# Patient Record
Sex: MALE | Race: WHITE | NOT HISPANIC OR LATINO | URBAN - METROPOLITAN AREA
[De-identification: names, ages, dates, MRNs, and addresses within clinical notes are randomized per-mention and may not be internally consistent; named-entity substitution may affect disease eponyms.]

---

## 2020-10-15 ENCOUNTER — INPATIENT (INPATIENT)
Facility: HOSPITAL | Age: 70
LOS: 1 days | Discharge: HOME | DRG: 246 | End: 2020-10-17
Attending: INTERNAL MEDICINE | Admitting: INTERNAL MEDICINE
Payer: MEDICARE

## 2020-10-15 VITALS
WEIGHT: 189.6 LBS | DIASTOLIC BLOOD PRESSURE: 63 MMHG | HEART RATE: 67 BPM | SYSTOLIC BLOOD PRESSURE: 148 MMHG | HEIGHT: 66 IN | RESPIRATION RATE: 16 BRPM | OXYGEN SATURATION: 97 %

## 2020-10-15 DIAGNOSIS — Z98.890 OTHER SPECIFIED POSTPROCEDURAL STATES: Chronic | ICD-10-CM

## 2020-10-15 DIAGNOSIS — K21.9 GASTRO-ESOPHAGEAL REFLUX DISEASE WITHOUT ESOPHAGITIS: ICD-10-CM

## 2020-10-15 DIAGNOSIS — D72.829 ELEVATED WHITE BLOOD CELL COUNT, UNSPECIFIED: ICD-10-CM

## 2020-10-15 DIAGNOSIS — I20.0 UNSTABLE ANGINA: ICD-10-CM

## 2020-10-15 LAB
A1C WITH ESTIMATED AVERAGE GLUCOSE RESULT: 6.3 % — HIGH (ref 4–5.6)
ALBUMIN SERPL ELPH-MCNC: 4.2 G/DL — SIGNIFICANT CHANGE UP (ref 3.3–5)
ALP SERPL-CCNC: 70 U/L — SIGNIFICANT CHANGE UP (ref 40–120)
ALT FLD-CCNC: 17 U/L — SIGNIFICANT CHANGE UP (ref 10–45)
ANION GAP SERPL CALC-SCNC: 11 MMOL/L — SIGNIFICANT CHANGE UP (ref 5–17)
APTT BLD: 58.2 SEC — HIGH (ref 27.5–35.5)
AST SERPL-CCNC: 16 U/L — SIGNIFICANT CHANGE UP (ref 10–40)
BASOPHILS # BLD AUTO: 0.03 K/UL — SIGNIFICANT CHANGE UP (ref 0–0.2)
BASOPHILS NFR BLD AUTO: 0.3 % — SIGNIFICANT CHANGE UP (ref 0–2)
BILIRUB SERPL-MCNC: 0.4 MG/DL — SIGNIFICANT CHANGE UP (ref 0.2–1.2)
BUN SERPL-MCNC: 16 MG/DL — SIGNIFICANT CHANGE UP (ref 7–23)
CALCIUM SERPL-MCNC: 9.2 MG/DL — SIGNIFICANT CHANGE UP (ref 8.4–10.5)
CHLORIDE SERPL-SCNC: 106 MMOL/L — SIGNIFICANT CHANGE UP (ref 96–108)
CHOLEST SERPL-MCNC: 158 MG/DL — SIGNIFICANT CHANGE UP (ref 10–199)
CO2 SERPL-SCNC: 24 MMOL/L — SIGNIFICANT CHANGE UP (ref 22–31)
CREAT SERPL-MCNC: 1.01 MG/DL — SIGNIFICANT CHANGE UP (ref 0.5–1.3)
EOSINOPHIL # BLD AUTO: 0.1 K/UL — SIGNIFICANT CHANGE UP (ref 0–0.5)
EOSINOPHIL NFR BLD AUTO: 0.8 % — SIGNIFICANT CHANGE UP (ref 0–6)
ESTIMATED AVERAGE GLUCOSE: 134 MG/DL — HIGH (ref 68–114)
GLUCOSE SERPL-MCNC: 115 MG/DL — HIGH (ref 70–99)
HCT VFR BLD CALC: 40.8 % — SIGNIFICANT CHANGE UP (ref 39–50)
HDLC SERPL-MCNC: 45 MG/DL — SIGNIFICANT CHANGE UP
HGB BLD-MCNC: 14.3 G/DL — SIGNIFICANT CHANGE UP (ref 13–17)
IMM GRANULOCYTES NFR BLD AUTO: 0.2 % — SIGNIFICANT CHANGE UP (ref 0–1.5)
INR BLD: 1.18 — HIGH (ref 0.88–1.16)
LIPID PNL WITH DIRECT LDL SERPL: 87 MG/DL — SIGNIFICANT CHANGE UP
LYMPHOCYTES # BLD AUTO: 25.5 % — SIGNIFICANT CHANGE UP (ref 13–44)
LYMPHOCYTES # BLD AUTO: 3.03 K/UL — SIGNIFICANT CHANGE UP (ref 1–3.3)
MCHC RBC-ENTMCNC: 30.9 PG — SIGNIFICANT CHANGE UP (ref 27–34)
MCHC RBC-ENTMCNC: 35 GM/DL — SIGNIFICANT CHANGE UP (ref 32–36)
MCV RBC AUTO: 88.1 FL — SIGNIFICANT CHANGE UP (ref 80–100)
MONOCYTES # BLD AUTO: 0.99 K/UL — HIGH (ref 0–0.9)
MONOCYTES NFR BLD AUTO: 8.3 % — SIGNIFICANT CHANGE UP (ref 2–14)
NEUTROPHILS # BLD AUTO: 7.7 K/UL — HIGH (ref 1.8–7.4)
NEUTROPHILS NFR BLD AUTO: 64.9 % — SIGNIFICANT CHANGE UP (ref 43–77)
NRBC # BLD: 0 /100 WBCS — SIGNIFICANT CHANGE UP (ref 0–0)
PLATELET # BLD AUTO: 183 K/UL — SIGNIFICANT CHANGE UP (ref 150–400)
POTASSIUM SERPL-MCNC: 3.9 MMOL/L — SIGNIFICANT CHANGE UP (ref 3.5–5.3)
POTASSIUM SERPL-SCNC: 3.9 MMOL/L — SIGNIFICANT CHANGE UP (ref 3.5–5.3)
PROT SERPL-MCNC: 7 G/DL — SIGNIFICANT CHANGE UP (ref 6–8.3)
PROTHROM AB SERPL-ACNC: 14.1 SEC — HIGH (ref 10.6–13.6)
RBC # BLD: 4.63 M/UL — SIGNIFICANT CHANGE UP (ref 4.2–5.8)
RBC # FLD: 13 % — SIGNIFICANT CHANGE UP (ref 10.3–14.5)
SODIUM SERPL-SCNC: 141 MMOL/L — SIGNIFICANT CHANGE UP (ref 135–145)
TOTAL CHOLESTEROL/HDL RATIO MEASUREMENT: 3.5 RATIO — SIGNIFICANT CHANGE UP (ref 3.4–9.6)
TRIGL SERPL-MCNC: 130 MG/DL — SIGNIFICANT CHANGE UP (ref 10–149)
TSH SERPL-MCNC: 3.52 UIU/ML — SIGNIFICANT CHANGE UP (ref 0.35–4.94)
WBC # BLD: 11.87 K/UL — HIGH (ref 3.8–10.5)
WBC # FLD AUTO: 11.87 K/UL — HIGH (ref 3.8–10.5)

## 2020-10-15 PROCEDURE — 93010 ELECTROCARDIOGRAM REPORT: CPT

## 2020-10-15 RX ORDER — CHLORHEXIDINE GLUCONATE 213 G/1000ML
1 SOLUTION TOPICAL ONCE
Refills: 0 | Status: DISCONTINUED | OUTPATIENT
Start: 2020-10-15 | End: 2020-10-17

## 2020-10-15 RX ORDER — METOPROLOL TARTRATE 50 MG
25 TABLET ORAL DAILY
Refills: 0 | Status: DISCONTINUED | OUTPATIENT
Start: 2020-10-15 | End: 2020-10-17

## 2020-10-15 RX ORDER — CHOLECALCIFEROL (VITAMIN D3) 125 MCG
1 CAPSULE ORAL
Qty: 0 | Refills: 0 | DISCHARGE

## 2020-10-15 RX ORDER — TICAGRELOR 90 MG/1
180 TABLET ORAL ONCE
Refills: 0 | Status: COMPLETED | OUTPATIENT
Start: 2020-10-16 | End: 2020-10-16

## 2020-10-15 RX ORDER — SODIUM CHLORIDE 9 MG/ML
1000 INJECTION INTRAMUSCULAR; INTRAVENOUS; SUBCUTANEOUS
Refills: 0 | Status: DISCONTINUED | OUTPATIENT
Start: 2020-10-16 | End: 2020-10-16

## 2020-10-15 RX ORDER — MULTIVIT-MIN/FERROUS GLUCONATE 9 MG/15 ML
1 LIQUID (ML) ORAL
Qty: 0 | Refills: 0 | DISCHARGE

## 2020-10-15 RX ORDER — ASPIRIN/CALCIUM CARB/MAGNESIUM 324 MG
81 TABLET ORAL DAILY
Refills: 0 | Status: DISCONTINUED | OUTPATIENT
Start: 2020-10-16 | End: 2020-10-17

## 2020-10-15 RX ORDER — ATORVASTATIN CALCIUM 80 MG/1
20 TABLET, FILM COATED ORAL AT BEDTIME
Refills: 0 | Status: DISCONTINUED | OUTPATIENT
Start: 2020-10-15 | End: 2020-10-16

## 2020-10-15 RX ORDER — TICAGRELOR 90 MG/1
90 TABLET ORAL EVERY 12 HOURS
Refills: 0 | Status: DISCONTINUED | OUTPATIENT
Start: 2020-10-17 | End: 2020-10-17

## 2020-10-15 RX ORDER — CHOLECALCIFEROL (VITAMIN D3) 125 MCG
2000 CAPSULE ORAL DAILY
Refills: 0 | Status: DISCONTINUED | OUTPATIENT
Start: 2020-10-15 | End: 2020-10-17

## 2020-10-15 NOTE — H&P ADULT - NSHPSOCIALHISTORY_GEN_ALL_CORE
Patient reports consuming about one drink per month, and states he had a drink last night (10/14/2020). Patient admits to "one or two puffs of a joint" per year but denies any active illicit drug use. Patient denies any tobacco use.

## 2020-10-15 NOTE — H&P ADULT - PROBLEM SELECTOR PLAN 3
Does not report any home medications   - ordered for Protonix 40 mg daily       VTE PPX: holding due to cardiac cath   Dispo: pending staged PCI

## 2020-10-15 NOTE — H&P ADULT - ASSESSMENT
71 y/o male, strong FHx CAD (father MI at 51 y/o) and PMHx GERD but otherwise healthy who presented to [facility] ED on 10/15/2020 c/o intermittent chest pain, worse w/ exertion, associated with TIPTON and nausea, and w/ radiation to the left shoulder for the last few days. Patient states symptoms occur w/ ambulation of 1 flight of stairs and walking up his driveway, and also admits to intermittent symptoms at rest. CCTA at [facility] on 10/15/2020 showed Ca score 581, moderate-severe mid LAD disease, moderate proximal LAD disease, cannot exclude significant disease of mid/distal RCA, and remaining arteries non-obstructive. Patient was loaded w/ Aspirin 325 mg PO once subsequently underwent diagnostic cardiac catheterization which revealed RCA 95% stenosis and proximal/mid LAD 80% stenosis. Radial access was used, radial band was applied, and patient was transferred w/ radial band in place. Patient was subsequently transferred to [facility] with a plan for staged PCI w/ Dr. Maryse España on 10/16/2020. Upon arrival to [facility], patient was chest pain free and denying any active ymptoms. VS were stable upon arrival and afebrile, EKG revealed NSR at 64 bpm w/ T wave inversion in V1, and labs were significant for WBC 11.87 but otherwise within normal limits. Radial band was removed w/o complications. Patient was consented for procedure, added to the schedule, and made NPO after midnight for staged PCI in AM.

## 2020-10-15 NOTE — H&P ADULT - NSICDXPASTSURGICALHX_GEN_ALL_CORE_FT
PAST SURGICAL HISTORY:  H/O shoulder surgery Left shoulder    S/P arthroscopy of right shoulder     S/P excision of varicocele

## 2020-10-15 NOTE — H&P ADULT - HISTORY OF PRESENT ILLNESS
69 y/o male, strong FHx CAD (father MI at 51 y/o) and PMHx GERD but otherwise healthy who presented to [facility] ED on 10/15/2020 c/o intermittent chest pain, worse w/ exertion, associated with TIPTON and nausea, and w/ radiation to the left shoulder for the last few days. Patient reports being able to ambulate 1 flight of stairs or up his driveway before he experiences these symptoms. Patient reports he also sometimes experiences these symptoms at rest as well. Patient also reports a "pins and needles" feeling in his right hand at times, but does not know whether this is associated w/ his other symptoms. Patient denied any dizziness, syncope, palpitations, abdominal pain, vomiting, back pain, LE edema, and melena. Patient also denied any recent fever, chills, cough, and known sick contacts. At [facility], VS were stable, labs significant for COVID PCR negative, troponins negative x 2, CXR w/o acute pathology, and EKG showing NSR at 77 bpm w/o acute ischemic changes.  CCTA was performed on 10/15/2020 showing Ca score 581, moderate-severe mid LAD disease, moderate proximal LAD disease, cannot exclude significant disease of mid/distal RCA, and remaining arteries non-obstructive. Patient was loaded w/ Aspirin 325 mg PO once subsequently underwent diagnostic cardiac catheterization which revealed RCA 95% stenosis and proximal/mid LAD 80% stenosis. Radial access was used, radial band was applied, and patient was transferred w/ radial band in place. Patient was subsequently transferred to [facility] with a plan for staged PCI w/ Dr. Maryse España on 10/16/2020.

## 2020-10-15 NOTE — H&P ADULT - NSHPLABSRESULTS_GEN_ALL_CORE
CBC Full  -  ( 15 Oct 2020 22:21 )  WBC Count : 11.87 K/uL  RBC Count : 4.63 M/uL  Hemoglobin : 14.3 g/dL  Hematocrit : 40.8 %  Platelet Count - Automated : 183 K/uL  Mean Cell Volume : 88.1 fl  Mean Cell Hemoglobin : 30.9 pg  Mean Cell Hemoglobin Concentration : 35.0 gm/dL  Auto Neutrophil # : 7.70 K/uL  Auto Lymphocyte # : 3.03 K/uL  Auto Monocyte # : 0.99 K/uL  Auto Eosinophil # : 0.10 K/uL  Auto Basophil # : 0.03 K/uL  Auto Neutrophil % : 64.9 %  Auto Lymphocyte % : 25.5 %  Auto Monocyte % : 8.3 %  Auto Eosinophil % : 0.8 %  Auto Basophil % : 0.3 %    10-15    141  |  106  |  16  ----------------------------<  115<H>  3.9   |  24  |  1.01    Ca    9.2      15 Oct 2020 22:22    TPro  7.0  /  Alb  4.2  /  TBili  0.4  /  DBili  x   /  AST  16  /  ALT  17  /  AlkPhos  70  10-15      PT/INR - ( 15 Oct 2020 22:21 )   PT: 14.1 sec;   INR: 1.18          PTT - ( 15 Oct 2020 22:21 )  PTT:58.2 sec

## 2020-10-15 NOTE — H&P ADULT - NSICDXFAMILYHX_GEN_ALL_CORE_FT
FAMILY HISTORY:  FHx: coronary artery disease, Father  FHx: myocardial infarction, Father,  at age 50

## 2020-10-15 NOTE — H&P ADULT - PROBLEM SELECTOR PLAN 2
WBC 11.87 upon arrival to [facility]  - patient afebrile, denies any fever, chills, cough, or known recent sick contacts   - COVID PCR negative, results in HIE   - continue to monitor

## 2020-10-15 NOTE — PATIENT PROFILE ADULT - NSPRESCRALCFREQ_GEN_A_NUR
Detail Level: Detailed Quality 130: Documentation Of Current Medications In The Medical Record: Current Medications Documented Never

## 2020-10-16 ENCOUNTER — TRANSCRIPTION ENCOUNTER (OUTPATIENT)
Age: 70
End: 2020-10-16

## 2020-10-16 LAB
ANION GAP SERPL CALC-SCNC: 11 MMOL/L — SIGNIFICANT CHANGE UP (ref 5–17)
BUN SERPL-MCNC: 15 MG/DL — SIGNIFICANT CHANGE UP (ref 7–23)
CALCIUM SERPL-MCNC: 9.6 MG/DL — SIGNIFICANT CHANGE UP (ref 8.4–10.5)
CHLORIDE SERPL-SCNC: 103 MMOL/L — SIGNIFICANT CHANGE UP (ref 96–108)
CO2 SERPL-SCNC: 25 MMOL/L — SIGNIFICANT CHANGE UP (ref 22–31)
CREAT SERPL-MCNC: 1.01 MG/DL — SIGNIFICANT CHANGE UP (ref 0.5–1.3)
GLUCOSE SERPL-MCNC: 112 MG/DL — HIGH (ref 70–99)
HCT VFR BLD CALC: 43.8 % — SIGNIFICANT CHANGE UP (ref 39–50)
HCV AB S/CO SERPL IA: 0.09 S/CO — SIGNIFICANT CHANGE UP
HCV AB SERPL-IMP: SIGNIFICANT CHANGE UP
HGB BLD-MCNC: 14.8 G/DL — SIGNIFICANT CHANGE UP (ref 13–17)
MAGNESIUM SERPL-MCNC: 2 MG/DL — SIGNIFICANT CHANGE UP (ref 1.6–2.6)
MCHC RBC-ENTMCNC: 30.6 PG — SIGNIFICANT CHANGE UP (ref 27–34)
MCHC RBC-ENTMCNC: 33.8 GM/DL — SIGNIFICANT CHANGE UP (ref 32–36)
MCV RBC AUTO: 90.5 FL — SIGNIFICANT CHANGE UP (ref 80–100)
NRBC # BLD: 0 /100 WBCS — SIGNIFICANT CHANGE UP (ref 0–0)
PLATELET # BLD AUTO: 206 K/UL — SIGNIFICANT CHANGE UP (ref 150–400)
POTASSIUM SERPL-MCNC: 4.1 MMOL/L — SIGNIFICANT CHANGE UP (ref 3.5–5.3)
POTASSIUM SERPL-SCNC: 4.1 MMOL/L — SIGNIFICANT CHANGE UP (ref 3.5–5.3)
RBC # BLD: 4.84 M/UL — SIGNIFICANT CHANGE UP (ref 4.2–5.8)
RBC # FLD: 12.9 % — SIGNIFICANT CHANGE UP (ref 10.3–14.5)
SODIUM SERPL-SCNC: 139 MMOL/L — SIGNIFICANT CHANGE UP (ref 135–145)
WBC # BLD: 9.01 K/UL — SIGNIFICANT CHANGE UP (ref 3.8–10.5)
WBC # FLD AUTO: 9.01 K/UL — SIGNIFICANT CHANGE UP (ref 3.8–10.5)

## 2020-10-16 PROCEDURE — 93454 CORONARY ARTERY ANGIO S&I: CPT | Mod: 26,59

## 2020-10-16 PROCEDURE — 92979 ENDOLUMINL IVUS OCT C EA: CPT | Mod: 26,LD

## 2020-10-16 PROCEDURE — 92978 ENDOLUMINL IVUS OCT C 1ST: CPT | Mod: 26,LD

## 2020-10-16 PROCEDURE — 92933 PRQ TRLML C ATHRC ST ANGIOP1: CPT | Mod: LD

## 2020-10-16 PROCEDURE — 92928 PRQ TCAT PLMT NTRAC ST 1 LES: CPT | Mod: RC

## 2020-10-16 PROCEDURE — 99222 1ST HOSP IP/OBS MODERATE 55: CPT

## 2020-10-16 RX ORDER — ATORVASTATIN CALCIUM 80 MG/1
40 TABLET, FILM COATED ORAL AT BEDTIME
Refills: 0 | Status: DISCONTINUED | OUTPATIENT
Start: 2020-10-16 | End: 2020-10-17

## 2020-10-16 RX ORDER — ONDANSETRON 8 MG/1
4 TABLET, FILM COATED ORAL ONCE
Refills: 0 | Status: COMPLETED | OUTPATIENT
Start: 2020-10-16 | End: 2020-10-16

## 2020-10-16 RX ORDER — TICAGRELOR 90 MG/1
1 TABLET ORAL
Qty: 60 | Refills: 11
Start: 2020-10-16 | End: 2021-10-10

## 2020-10-16 RX ORDER — PANTOPRAZOLE SODIUM 20 MG/1
40 TABLET, DELAYED RELEASE ORAL
Refills: 0 | Status: DISCONTINUED | OUTPATIENT
Start: 2020-10-16 | End: 2020-10-17

## 2020-10-16 RX ORDER — SODIUM CHLORIDE 9 MG/ML
500 INJECTION INTRAMUSCULAR; INTRAVENOUS; SUBCUTANEOUS
Refills: 0 | Status: DISCONTINUED | OUTPATIENT
Start: 2020-10-16 | End: 2020-10-17

## 2020-10-16 RX ORDER — ACETAMINOPHEN 500 MG
650 TABLET ORAL ONCE
Refills: 0 | Status: DISCONTINUED | OUTPATIENT
Start: 2020-10-16 | End: 2020-10-16

## 2020-10-16 RX ADMIN — Medication 2000 UNIT(S): at 12:02

## 2020-10-16 RX ADMIN — Medication 25 MILLIGRAM(S): at 05:43

## 2020-10-16 RX ADMIN — SODIUM CHLORIDE 75 MILLILITER(S): 9 INJECTION INTRAMUSCULAR; INTRAVENOUS; SUBCUTANEOUS at 19:44

## 2020-10-16 RX ADMIN — TICAGRELOR 180 MILLIGRAM(S): 90 TABLET ORAL at 05:43

## 2020-10-16 RX ADMIN — Medication 81 MILLIGRAM(S): at 05:44

## 2020-10-16 RX ADMIN — ONDANSETRON 4 MILLIGRAM(S): 8 TABLET, FILM COATED ORAL at 23:08

## 2020-10-16 RX ADMIN — ATORVASTATIN CALCIUM 40 MILLIGRAM(S): 80 TABLET, FILM COATED ORAL at 21:18

## 2020-10-16 RX ADMIN — SODIUM CHLORIDE 75 MILLILITER(S): 9 INJECTION INTRAMUSCULAR; INTRAVENOUS; SUBCUTANEOUS at 05:44

## 2020-10-16 NOTE — PROGRESS NOTE ADULT - PROBLEM SELECTOR PLAN 3
Does not report any home medications   - ordered for Protonix 40 mg daily       VTE PPX: holding due to cardiac cath   Dispo: pending PCI

## 2020-10-16 NOTE — DISCHARGE NOTE PROVIDER - NSDCCPCAREPLAN_GEN_ALL_CORE_FT
PRINCIPAL DISCHARGE DIAGNOSIS  Diagnosis: CAD (coronary artery disease)  Assessment and Plan of Treatment: You underwent a cardiac angiogram and received 2 stents to your proximal/mid Right Coronary Artery (RCA), as well as 2 stents to your Left Anterior Descending Artery (LAD). PLEASE CONTINUE ASPIRIN 81MG DAILY AND BRILINTA 90MG TWICE DAILY. DO NOT STOP THESE MEDICATIONS FOR ANY REASON AS THEY ARE KEEPING YOUR STENT OPEN AND PREVENTING A HEART ATTACK. Avoid strenuous activity or heavy lifting for the next five days. Do not take a bath or swim for the next five days; you may shower. For any bleeding or hematoma formation (hardened blood collection under the skin) at the access site of right wrist please hold pressure and go to the emergency room. Please follow up with Dr. Joseph in 1-2 weeks. For recurrent chest pain, please call your doctor or go to the emergency room.   Prescriptions for Aspirin and Brilinta have been sent to your pharmacy. Brilinta has a $30/month co-pay.      SECONDARY DISCHARGE DIAGNOSES  Diagnosis: HTN (hypertension)  Assessment and Plan of Treatment: Please continue Toprol-XL 25mg by mouth daily to keep your blood pressure controlled. For blood pressure that is too high or too low please see your doctor or go to the emergency room as necessary. This prescription has been sent to your pharmacy.    Diagnosis: HLD (hyperlipidemia)  Assessment and Plan of Treatment: Please continue Atorvastatin 40mg by mouth daily at bedtime to keep your cholesterol low. High cholesterol contributes to heart disease. This prescription has been sent to your pharmacy.     PRINCIPAL DISCHARGE DIAGNOSIS  Diagnosis: CAD (coronary artery disease)  Assessment and Plan of Treatment: You underwent a cardiac angiogram and received 2 stents to your proximal/mid Right Coronary Artery (RCA), as well as 2 stents to your Left Anterior Descending Artery (LAD). PLEASE CONTINUE ASPIRIN 81MG DAILY AND BRILINTA 90MG TWICE DAILY. DO NOT STOP THESE MEDICATIONS FOR ANY REASON AS THEY ARE KEEPING YOUR STENT OPEN AND PREVENTING A HEART ATTACK. Avoid strenuous activity or heavy lifting for the next five days. Do not take a bath or swim for the next five days; you may shower. For any bleeding or hematoma formation (hardened blood collection under the skin) at the access site of right wrist please hold pressure and go to the emergency room. Please follow up with Dr. España in 1-2 weeks. For recurrent chest pain, please call your doctor or go to the emergency room.   Prescriptions for Aspirin and Brilinta have been sent to your pharmacy. Brilinta has a $30/month co-pay.      SECONDARY DISCHARGE DIAGNOSES  Diagnosis: HTN (hypertension)  Assessment and Plan of Treatment: Please continue Toprol-XL 25mg by mouth daily to keep your blood pressure controlled. For blood pressure that is too high or too low please see your doctor or go to the emergency room as necessary. This prescription has been sent to your pharmacy.    Diagnosis: HLD (hyperlipidemia)  Assessment and Plan of Treatment: Please continue Atorvastatin 40mg by mouth daily at bedtime to keep your cholesterol low. High cholesterol contributes to heart disease. This prescription has been sent to your pharmacy.

## 2020-10-16 NOTE — PROGRESS NOTE ADULT - ASSESSMENT
71 y/o male, strong FHx CAD (father MI at 51 y/o) and PMHx GERD but otherwise healthy who presented to [facility] ED on 10/15/2020 c/o intermittent chest pain, worse w/ exertion, associated with TIPTON and nausea, and w/ radiation to the left shoulder for the last few days. Patient states symptoms occur w/ ambulation of 1 flight of stairs and walking up his driveway, and also admits to intermittent symptoms at rest. CCTA at [facility] on 10/15/2020 showed Ca score 581, moderate-severe mid LAD disease, moderate proximal LAD disease, cannot exclude significant disease of mid/distal RCA, and remaining arteries non-obstructive. Patient was loaded w/ Aspirin 325 mg PO once subsequently underwent diagnostic cardiac catheterization which revealed RCA 95% stenosis and proximal/mid LAD 80% stenosis. Radial access was used, radial band was applied, and patient was transferred w/ radial band in place. Patient was subsequently transferred to [facility] with a plan for staged PCI w/ Dr. Maryse España on 10/16/2020. Upon arrival to [facility], patient was chest pain free and denying any active ymptoms. VS were stable upon arrival and afebrile, EKG revealed NSR at 64 bpm w/ T wave inversion in V1, and labs were significant for WBC 11.87 but otherwise within normal limits. Radial band was removed w/o complications. Patient was consented for procedure, added to the schedule, and made NPO after midnight for staged PCI in AM.

## 2020-10-16 NOTE — PROGRESS NOTE ADULT - PROBLEM SELECTOR PLAN 2
- WBC 11.87 upon arrival to [facility]  - patient afebrile, denies any fever, chills, cough, or known recent sick contacts   - COVID PCR negative, results in HIE   - WBC 9.01 on 10/16/2020.   - continue to monitor

## 2020-10-16 NOTE — DISCHARGE NOTE PROVIDER - PROVIDER TOKENS
PROVIDER:[TOKEN:[[id number]]] FREE:[LAST:[[place]],FIRST:[Maryse RODGERS)],PHONE:[phone]],FAX:[(   )    -],ADDRESS:[[address] [place] [address]]]

## 2020-10-16 NOTE — PROGRESS NOTE ADULT - SUBJECTIVE AND OBJECTIVE BOX
Two vessel Coronary Artery Disease  Syntax score 13  Frailty score 4    Coronary Angiogram  LMCA: Normal  LAD: 80% stenosis in prox, 80% stenosis in mid segments, heavily calcified  D1: Mild luminal irregularities  LCx: Mild luminal irregularities  OM1: Mild luminal irregularities  RCA: Large, dominant vessel with 80% stenosis in mid segment, 95% stenosis in distal segment    Left Heart Catheterization  LV Gram: Not performed    Intervention  - Successful OCT guided PCI of 95% stenosis in distal RCA with Promus DESx1. 0% residual stenosis and excellent angiographic result with ARNOLD 3 flow post intervention.  - Successful OCT guided PCI of 80% stenosis in mid RCA with Promus DESx1. 0% residual stenosis and excellent angiographic result with ARNOLD 3 flow post intervention.  - Successful OCT guided Rotational Atherectomy (RotaPro 1.5mm wilner) followed by PCI of 80% stenosis in prox and mid LAD with Promus DESx2. 0% residual stenosis and excellent angiographic result with ARNOLD 3 flow post intervention.      Radial band placed on Right Radial access site with adequate hemostasis prior to leaving the cath lab  No procedural complications        Recommendations  Dual anti-platelet therapy with Aspirin 81mg daily and Brilinta 90mg BID for at least 1 year after which Aspirin 81mg daily should be continued indefinitely  Optimal medical therapy as tolerated, including appropriate intensity statin, beta blocker and ACE/ARB if indicated  Risk factor modification and risk reduction strategies with lifestyle changes and preventative cardiology  Follow up with outpatient cardiologist (Dr Joseph) post discharge
Interventional Cardiology PA Adult Progress Note    Subjective Assessment: pt seen and evaluated at bedside. Reports he felt well overnight, other than some mild, quickly resolving chest pressure when he gets up and walks around. No complaints at this time. Pt denies SOB, diaphoresis, dizziness/syncope, abdominal pain, N/V. He reports no pain at the right radial access site.   	  MEDICATIONS:  metoprolol succinate ER 25 milliGRAM(s) Oral daily  pantoprazole    Tablet 40 milliGRAM(s) Oral before breakfast  atorvastatin 40 milliGRAM(s) Oral at bedtime  aspirin enteric coated 81 milliGRAM(s) Oral daily  chlorhexidine 4% Liquid 1 Application(s) Topical once  cholecalciferol 2000 Unit(s) Oral daily  sodium chloride 0.9%. 1000 milliLiter(s) IV Continuous <Continuous>      PHYSICAL EXAM:  TELEMETRY: no events on telemetry overnight  T(C): 37.1 (10-16-20 @ 10:19), Max: 37.1 (10-16-20 @ 10:19)  HR: 74 (10-16-20 @ 08:20) (67 - 74)  BP: 155/75 (10-16-20 @ 08:20) (131/73 - 155/75)  RR: 18 (10-16-20 @ 08:20) (16 - 18)  SpO2: 98% (10-16-20 @ 08:20) (97% - 98%)  Wt(kg): --  I&O's Summary    Height (cm): 167.6 (10-15 @ 23:11)  Weight (kg): 86 (10-15 @ 23:11)  BMI (kg/m2): 30.6 (10-15 @ 23:11)  BSA (m2): 1.95 (10-15 @ 23:11)  Cerna:  Central/PICC/Mid Line:                                         Appearance: Normal	  HEENT:   Normal oral mucosa, PERRL, EOMI	  Neck: Supple, - JVD; no Carotid Bruit   Cardiovascular: Normal S1 S2, No JVD, No murmurs,   Respiratory: Lungs clear to auscultation; No signs of respiratory distress. 	  Gastrointestinal:  Soft, Non-tender, + BS	  Skin: No rashes, No ecchymoses, No cyanosis  Extremities: Normal range of motion, No clubbing, cyanosis or edema  Vascular: Peripheral pulses palpable 2+ bilaterally; No femoral bruit's bilaterally.  Neurologic: Non-focal  Psychiatry: A & O x 3, Mood & affect appropriate  ACCESS SITE: right radial access site from previous cardiac catheterization stable without signs of bleeding or hematoma.       LABS:	 	  CARDIAC MARKERS:             14.8   9.01  )-----------( 206      ( 16 Oct 2020 07:25 )             43.8     139  |  103  |  15  ----------------------------<  112<H>  4.1   |  25  |  1.01    Ca    9.6      16 Oct 2020 07:25  Mg     2.0     10-16    TPro  7.0  /  Alb  4.2  /  TBili  0.4  /  DBili  x   /  AST  16  /  ALT  17  /  AlkPhos  70  10-15      TSH: Thyroid Stimulating Hormone, Serum: 3.525 uIU/mL (10-15 @ 22:21)    PT/INR - ( 15 Oct 2020 22:21 )   PT: 14.1 sec;   INR: 1.18          PTT - ( 15 Oct 2020 22:21 )  PTT:58.2 sec

## 2020-10-16 NOTE — PROGRESS NOTE ADULT - ATTENDING COMMENTS
Initial attending contact date 10/16/20     . See PA note written above for details. I reviewed the PA documentation. I have personally seen and examined this patient. I reviewed vitals, labs, medications, cardiac studies, and additional imaging. I agree with the above PA's findings and plans as written above with the following additions/statements.    -pt with mild CP this am  -With known CAD by diagnostic Cath @ [facility], for planned PCI today  -Cont ASA/brilinita/statin/toprol. Brian prn pain  -ECHO  -Plan for DC 10/17

## 2020-10-16 NOTE — DISCHARGE NOTE PROVIDER - NSDCMRMEDTOKEN_GEN_ALL_CORE_FT
Aspirin Enteric Coated 81 mg oral delayed release tablet: 1 tab(s) orally once a day  Benadryl 25 mg oral tablet: 1 tab(s) orally once a day (at bedtime), As Needed  Centrum Ultra Men&#x27;s oral tablet: 1 tab(s) orally once a day  ticagrelor 90 mg oral tablet: 1 tab(s) orally every 12 hours  Vitamin D3 2000 intl units (50 mcg) oral tablet: 1 tab(s) orally once a day   Aspirin Enteric Coated 81 mg oral delayed release tablet: 1 tab(s) orally once a day  atorvastatin 40 mg oral tablet: 1 tab(s) orally once a day (at bedtime)  Centrum Ultra Men&#x27;s oral tablet: 1 tab(s) orally once a day  metoprolol succinate 25 mg oral tablet, extended release: 1 tab(s) orally once a day   ticagrelor 90 mg oral tablet: 1 tab(s) orally every 12 hours  Vitamin D3 2000 intl units (50 mcg) oral tablet: 1 tab(s) orally once a day

## 2020-10-16 NOTE — PROGRESS NOTE ADULT - PROBLEM SELECTOR PLAN 1
- Presented to [facility] w/ chest pain and TIPTON for few days   - EKG at [facility] showed NSR at 77 bpm w/o ischemic changes   - Trop (-) x2 at [facility] negative x 2  - EKG at [facility] NSR at 64 bpm w/ T wave inversion in V1  - CCTA (10/15/2020) at [facility] showed Ca score 581, moderate-severe mid LAD disease, moderate proximal LAD disease  - DIAGNOSTIC CATH @ [facility] - RCA 95% stenosis and prox/mid LAD 80% stenosis.   - Loaded w/ ASA 325mg PO prior to [facility] cath; Loaded w/ Brilinta 180mg PO x1 at [facility].   - PLAN: planned PCI w/ Dr. España 10/16/2020.   - CONTINUE: ASA 81mg PO QD, Brilinta 90mg PO BID (starting 10/17/20), Atorvastatin 40mg PO QD.   - F/U results of cardiac catheterization.

## 2020-10-16 NOTE — DISCHARGE NOTE PROVIDER - HOSPITAL COURSE
... 69 y/o male, strong FHx CAD (father MI at 51 y/o) and PMHx GERD but otherwise healthy who presented to [facility] ED on 10/15/2020 c/o intermittent chest pain, worse w/ exertion, associated with TIPTON and nausea, and w/ radiation to the left shoulder for the last few days. Patient reports being able to ambulate 1 flight of stairs or up his driveway before he experiences these symptoms. Patient reports he also sometimes experiences these symptoms at rest as well. Patient also reports a "pins and needles" feeling in his right hand at times, but does not know whether this is associated w/ his other symptoms. Patient denied any dizziness, syncope, palpitations, abdominal pain, vomiting, back pain, LE edema, and melena. Patient also denied any recent fever, chills, cough, and known sick contacts. At [facility], VS were stable, labs significant for COVID PCR negative, troponins negative x 2, CXR w/o acute pathology, and EKG showing NSR at 77 bpm w/o acute ischemic changes.  CCTA was performed on 10/15/2020 showing Ca score 581, moderate-severe mid LAD disease, moderate proximal LAD disease, cannot exclude significant disease of mid/distal RCA, and remaining arteries non-obstructive. Patient was loaded w/ Aspirin 325 mg PO once subsequently underwent diagnostic cardiac catheterization which revealed RCA 95% stenosis and proximal/mid LAD 80% stenosis. Radial access was used, radial band was applied, and patient was transferred w/ radial band in place. Patient was subsequently transferred to [facility] with a plan for staged PCI w/ Dr. España on 10/16/2020.    Pt is s/p cardiac catheterization (10/16/2020) w/ FORREST x2 to m/dRCA and FORREST x2 to LAD. Pt w/ R radial access, with successful hemostasis achieved via TR band. Intra-procedure, pt w/ episode of HTN SBP 160s, and was given Hydralazine 10mg IV x1 and Lopressor 5mg IV x1. Late in procedure, patient had a vasovagal episode and was given Neosynephrine 4mg IV x1, Atropine 0.6mg IV x1, and NS 500cc bolus x1. Pt was also given Zofran 8mg IV x1 for post-procedure nausea.     There were no overnight events. Pt denies complaints at this time. Physical exam benign, no events on telemetry, vital signs stable. Labs, vitals, telemetry, and hospital course reviewed with telemetry attending and patient deemed stable for discharge home.     DAPT: ASA 81mg PO QD, Brilinta 90mg PO BID ($30/month co-pay; management expense for the patient).   Statin Therapy: Atorvastatin 40mg PO QD  F/U Cardiologist: Dr. España

## 2020-10-16 NOTE — DISCHARGE NOTE PROVIDER - NSDCFUADDAPPT_GEN_ALL_CORE_FT
Please follow up with Dr. Joseph at [facility] within 1-2 weeks of discharge from the hospital. Please follow up with Dr. España within 1-2 weeks of discharge from the hospital.

## 2020-10-16 NOTE — DISCHARGE NOTE PROVIDER - CARE PROVIDER_API CALL
Maryse España (MD)  Cardiovascular Disease; Internal Medicine  [address] [place] [address]  Phone: [phone]  Fax: [phone]  Follow Up Time:    Maryse España)  [address] [place] [address]  Phone: [phone]  Fax: (   )    -  Follow Up Time:

## 2020-10-17 ENCOUNTER — TRANSCRIPTION ENCOUNTER (OUTPATIENT)
Age: 70
End: 2020-10-17

## 2020-10-17 VITALS — TEMPERATURE: 98 F

## 2020-10-17 LAB
ALBUMIN SERPL ELPH-MCNC: 3.9 G/DL — SIGNIFICANT CHANGE UP (ref 3.3–5)
ALP SERPL-CCNC: 65 U/L — SIGNIFICANT CHANGE UP (ref 40–120)
ALT FLD-CCNC: 14 U/L — SIGNIFICANT CHANGE UP (ref 10–45)
ANION GAP SERPL CALC-SCNC: 13 MMOL/L — SIGNIFICANT CHANGE UP (ref 5–17)
AST SERPL-CCNC: 26 U/L — SIGNIFICANT CHANGE UP (ref 10–40)
BASOPHILS # BLD AUTO: 0.02 K/UL — SIGNIFICANT CHANGE UP (ref 0–0.2)
BASOPHILS NFR BLD AUTO: 0.2 % — SIGNIFICANT CHANGE UP (ref 0–2)
BILIRUB SERPL-MCNC: 0.6 MG/DL — SIGNIFICANT CHANGE UP (ref 0.2–1.2)
BUN SERPL-MCNC: 13 MG/DL — SIGNIFICANT CHANGE UP (ref 7–23)
CALCIUM SERPL-MCNC: 9.2 MG/DL — SIGNIFICANT CHANGE UP (ref 8.4–10.5)
CHLORIDE SERPL-SCNC: 101 MMOL/L — SIGNIFICANT CHANGE UP (ref 96–108)
CO2 SERPL-SCNC: 23 MMOL/L — SIGNIFICANT CHANGE UP (ref 22–31)
CREAT SERPL-MCNC: 1.2 MG/DL — SIGNIFICANT CHANGE UP (ref 0.5–1.3)
EOSINOPHIL # BLD AUTO: 0.02 K/UL — SIGNIFICANT CHANGE UP (ref 0–0.5)
EOSINOPHIL NFR BLD AUTO: 0.2 % — SIGNIFICANT CHANGE UP (ref 0–6)
GLUCOSE SERPL-MCNC: 124 MG/DL — HIGH (ref 70–99)
HCT VFR BLD CALC: 39.2 % — SIGNIFICANT CHANGE UP (ref 39–50)
HGB BLD-MCNC: 13.5 G/DL — SIGNIFICANT CHANGE UP (ref 13–17)
IMM GRANULOCYTES NFR BLD AUTO: 0.5 % — SIGNIFICANT CHANGE UP (ref 0–1.5)
LYMPHOCYTES # BLD AUTO: 17.5 % — SIGNIFICANT CHANGE UP (ref 13–44)
LYMPHOCYTES # BLD AUTO: 2.1 K/UL — SIGNIFICANT CHANGE UP (ref 1–3.3)
MAGNESIUM SERPL-MCNC: 2.1 MG/DL — SIGNIFICANT CHANGE UP (ref 1.6–2.6)
MCHC RBC-ENTMCNC: 30.9 PG — SIGNIFICANT CHANGE UP (ref 27–34)
MCHC RBC-ENTMCNC: 34.4 GM/DL — SIGNIFICANT CHANGE UP (ref 32–36)
MCV RBC AUTO: 89.7 FL — SIGNIFICANT CHANGE UP (ref 80–100)
MONOCYTES # BLD AUTO: 1.05 K/UL — HIGH (ref 0–0.9)
MONOCYTES NFR BLD AUTO: 8.7 % — SIGNIFICANT CHANGE UP (ref 2–14)
NEUTROPHILS # BLD AUTO: 8.77 K/UL — HIGH (ref 1.8–7.4)
NEUTROPHILS NFR BLD AUTO: 72.9 % — SIGNIFICANT CHANGE UP (ref 43–77)
NRBC # BLD: 0 /100 WBCS — SIGNIFICANT CHANGE UP (ref 0–0)
PLATELET # BLD AUTO: 194 K/UL — SIGNIFICANT CHANGE UP (ref 150–400)
POTASSIUM SERPL-MCNC: 4 MMOL/L — SIGNIFICANT CHANGE UP (ref 3.5–5.3)
POTASSIUM SERPL-SCNC: 4 MMOL/L — SIGNIFICANT CHANGE UP (ref 3.5–5.3)
PROT SERPL-MCNC: 6.6 G/DL — SIGNIFICANT CHANGE UP (ref 6–8.3)
RBC # BLD: 4.37 M/UL — SIGNIFICANT CHANGE UP (ref 4.2–5.8)
RBC # FLD: 13.2 % — SIGNIFICANT CHANGE UP (ref 10.3–14.5)
SODIUM SERPL-SCNC: 137 MMOL/L — SIGNIFICANT CHANGE UP (ref 135–145)
WBC # BLD: 12.02 K/UL — HIGH (ref 3.8–10.5)
WBC # FLD AUTO: 12.02 K/UL — HIGH (ref 3.8–10.5)

## 2020-10-17 PROCEDURE — 99239 HOSP IP/OBS DSCHRG MGMT >30: CPT

## 2020-10-17 RX ORDER — DIPHENHYDRAMINE HCL 50 MG
1 CAPSULE ORAL
Qty: 0 | Refills: 0 | DISCHARGE

## 2020-10-17 RX ORDER — ASPIRIN/CALCIUM CARB/MAGNESIUM 324 MG
1 TABLET ORAL
Qty: 30 | Refills: 11
Start: 2020-10-17 | End: 2021-10-11

## 2020-10-17 RX ORDER — ASPIRIN/CALCIUM CARB/MAGNESIUM 324 MG
1 TABLET ORAL
Qty: 0 | Refills: 0 | DISCHARGE

## 2020-10-17 RX ORDER — ATORVASTATIN CALCIUM 80 MG/1
1 TABLET, FILM COATED ORAL
Qty: 30 | Refills: 3
Start: 2020-10-17 | End: 2021-02-13

## 2020-10-17 RX ORDER — METOPROLOL TARTRATE 50 MG
1 TABLET ORAL
Qty: 30 | Refills: 3
Start: 2020-10-17 | End: 2021-02-13

## 2020-10-17 RX ADMIN — Medication 2000 UNIT(S): at 11:54

## 2020-10-17 RX ADMIN — PANTOPRAZOLE SODIUM 40 MILLIGRAM(S): 20 TABLET, DELAYED RELEASE ORAL at 05:12

## 2020-10-17 RX ADMIN — Medication 81 MILLIGRAM(S): at 11:54

## 2020-10-17 RX ADMIN — TICAGRELOR 90 MILLIGRAM(S): 90 TABLET ORAL at 05:12

## 2020-10-17 RX ADMIN — Medication 25 MILLIGRAM(S): at 05:12

## 2020-10-17 NOTE — DISCHARGE NOTE NURSING/CASE MANAGEMENT/SOCIAL WORK - PATIENT PORTAL LINK FT
You can access the FollowMyHealth Patient Portal offered by [facility] by registering at the following website: [email or web address]. By joining [email or web address]’s FollowMyHealth portal, you will also be able to view your health information using other applications (apps) compatible with our system.

## 2020-10-23 DIAGNOSIS — Z82.49 FAMILY HISTORY OF ISCHEMIC HEART DISEASE AND OTHER DISEASES OF THE CIRCULATORY SYSTEM: ICD-10-CM

## 2020-10-23 DIAGNOSIS — Z79.82 LONG TERM (CURRENT) USE OF ASPIRIN: ICD-10-CM

## 2020-10-23 DIAGNOSIS — K21.9 GASTRO-ESOPHAGEAL REFLUX DISEASE WITHOUT ESOPHAGITIS: ICD-10-CM

## 2020-10-23 DIAGNOSIS — I10 ESSENTIAL (PRIMARY) HYPERTENSION: ICD-10-CM

## 2020-10-23 DIAGNOSIS — E78.5 HYPERLIPIDEMIA, UNSPECIFIED: ICD-10-CM

## 2020-10-23 DIAGNOSIS — I25.110 ATHEROSCLEROTIC HEART DISEASE OF NATIVE CORONARY ARTERY WITH UNSTABLE ANGINA PECTORIS: ICD-10-CM

## 2020-10-23 DIAGNOSIS — D72.829 ELEVATED WHITE BLOOD CELL COUNT, UNSPECIFIED: ICD-10-CM

## 2020-10-26 PROBLEM — I25.10 ATHEROSCLEROTIC HEART DISEASE OF NATIVE CORONARY ARTERY WITHOUT ANGINA PECTORIS: Chronic | Status: ACTIVE | Noted: 2020-10-15

## 2020-10-26 PROBLEM — K21.9 GASTRO-ESOPHAGEAL REFLUX DISEASE WITHOUT ESOPHAGITIS: Chronic | Status: ACTIVE | Noted: 2020-10-15

## 2020-10-27 ENCOUNTER — APPOINTMENT (OUTPATIENT)
Dept: HEART AND VASCULAR | Facility: CLINIC | Age: 70
End: 2020-10-27
Payer: MEDICARE

## 2020-10-27 VITALS
HEART RATE: 62 BPM | DIASTOLIC BLOOD PRESSURE: 70 MMHG | SYSTOLIC BLOOD PRESSURE: 135 MMHG | WEIGHT: 193 LBS | TEMPERATURE: 97.2 F

## 2020-10-27 DIAGNOSIS — Z82.49 FAMILY HISTORY OF ISCHEMIC HEART DISEASE AND OTHER DISEASES OF THE CIRCULATORY SYSTEM: ICD-10-CM

## 2020-10-27 DIAGNOSIS — K21.9 GASTRO-ESOPHAGEAL REFLUX DISEASE W/OUT ESOPHAGITIS: ICD-10-CM

## 2020-10-27 DIAGNOSIS — E78.5 HYPERLIPIDEMIA, UNSPECIFIED: ICD-10-CM

## 2020-10-27 DIAGNOSIS — I25.10 ATHEROSCLEROTIC HEART DISEASE OF NATIVE CORONARY ARTERY W/OUT ANGINA PECTORIS: ICD-10-CM

## 2020-10-27 PROBLEM — Z00.00 ENCOUNTER FOR PREVENTIVE HEALTH EXAMINATION: Status: ACTIVE | Noted: 2020-10-27

## 2020-10-27 PROCEDURE — 99213 OFFICE O/P EST LOW 20 MIN: CPT

## 2020-10-27 PROCEDURE — 93000 ELECTROCARDIOGRAM COMPLETE: CPT

## 2020-10-27 NOTE — DISCUSSION/SUMMARY
[___ Month(s)] : [unfilled] month(s) [FreeTextEntry1] : 69 y/o M with CAD s/p PCI to RCA/LAD 10/16/2020 at [facility] presents for follow up, doing well overall since PCI, no active complaints today\par \par EKG: Sinus rosa, otherwise normal\par \par # CAD\par Continue ASA, Brilinta, continue PPI\par OMT with Lipitor and Toprol XL\par BPs at goal\par \par # HLD\par Continue statin\par \par RTC in 6 months

## 2020-10-27 NOTE — HISTORY OF PRESENT ILLNESS
[FreeTextEntry1] : 69 y/o M with PMHx of CAD s/p PCI to RCA / Rota PCI to LAD 10/16/2020 at [facility], HLD, FHx of CAD presents for follow up post hospitalization and intervention.\par \par Patient states that he has been doing well and had significant improvement of symptoms that lead him to get admitted at [facility] and then transferred to [facility] for intervention.\par No issues at access site (radial artery) and not other complaints.\par Has not been very active since his discharge from hospital.

## 2020-10-27 NOTE — REASON FOR VISIT
[Follow-Up - From Hospitalization] : follow-up of a recent hospitalization for [Coronary Artery Disease] : coronary artery disease [Spouse] : spouse

## 2020-10-27 NOTE — PHYSICAL EXAM
[General Appearance - Well Developed] : well developed [Normal Appearance] : normal appearance [Well Groomed] : well groomed [General Appearance - Well Nourished] : well nourished [No Deformities] : no deformities [General Appearance - In No Acute Distress] : no acute distress [Normal Conjunctiva] : the conjunctiva exhibited no abnormalities [Eyelids - No Xanthelasma] : the eyelids demonstrated no xanthelasmas [Normal Oral Mucosa] : normal oral mucosa [No Oral Pallor] : no oral pallor [No Oral Cyanosis] : no oral cyanosis [Normal Jugular Venous A Waves Present] : normal jugular venous A waves present [Normal Jugular Venous V Waves Present] : normal jugular venous V waves present [No Jugular Venous Burton A Waves] : no jugular venous burton A waves [Heart Rate And Rhythm] : heart rate and rhythm were normal [Heart Sounds] : normal S1 and S2 [Murmurs] : no murmurs present [Respiration, Rhythm And Depth] : normal respiratory rhythm and effort [Exaggerated Use Of Accessory Muscles For Inspiration] : no accessory muscle use [Auscultation Breath Sounds / Voice Sounds] : lungs were clear to auscultation bilaterally [Abdomen Soft] : soft [Abdomen Tenderness] : non-tender [Abdomen Mass (___ Cm)] : no abdominal mass palpated [Nail Clubbing] : no clubbing of the fingernails [Cyanosis, Localized] : no localized cyanosis [Petechial Hemorrhages (___cm)] : no petechial hemorrhages [] : no ischemic changes [FreeTextEntry1] : Radial access site looks good

## 2020-10-28 PROCEDURE — C1769: CPT

## 2020-10-28 PROCEDURE — C1725: CPT

## 2020-10-28 PROCEDURE — C1874: CPT

## 2020-10-28 PROCEDURE — 85730 THROMBOPLASTIN TIME PARTIAL: CPT

## 2020-10-28 PROCEDURE — C1894: CPT

## 2020-10-28 PROCEDURE — C1724: CPT

## 2020-10-28 PROCEDURE — 83735 ASSAY OF MAGNESIUM: CPT

## 2020-10-28 PROCEDURE — 80061 LIPID PANEL: CPT

## 2020-10-28 PROCEDURE — 80048 BASIC METABOLIC PNL TOTAL CA: CPT

## 2020-10-28 PROCEDURE — 85025 COMPLETE CBC W/AUTO DIFF WBC: CPT

## 2020-10-28 PROCEDURE — 85027 COMPLETE CBC AUTOMATED: CPT

## 2020-10-28 PROCEDURE — 86803 HEPATITIS C AB TEST: CPT

## 2020-10-28 PROCEDURE — 84443 ASSAY THYROID STIM HORMONE: CPT

## 2020-10-28 PROCEDURE — 80053 COMPREHEN METABOLIC PANEL: CPT

## 2020-10-28 PROCEDURE — 36415 COLL VENOUS BLD VENIPUNCTURE: CPT

## 2020-10-28 PROCEDURE — 93005 ELECTROCARDIOGRAM TRACING: CPT

## 2020-10-28 PROCEDURE — 85610 PROTHROMBIN TIME: CPT

## 2020-10-28 PROCEDURE — 83036 HEMOGLOBIN GLYCOSYLATED A1C: CPT

## 2020-10-28 PROCEDURE — C1887: CPT

## 2020-10-28 PROCEDURE — C1753: CPT

## 2021-04-06 ENCOUNTER — APPOINTMENT (OUTPATIENT)
Dept: HEART AND VASCULAR | Facility: CLINIC | Age: 71
End: 2021-04-06
Payer: MEDICARE

## 2021-04-06 VITALS
WEIGHT: 188 LBS | HEART RATE: 45 BPM | OXYGEN SATURATION: 97 % | TEMPERATURE: 96.2 F | SYSTOLIC BLOOD PRESSURE: 110 MMHG | BODY MASS INDEX: 30.22 KG/M2 | DIASTOLIC BLOOD PRESSURE: 60 MMHG | HEIGHT: 66 IN

## 2021-04-06 PROCEDURE — 99214 OFFICE O/P EST MOD 30 MIN: CPT

## 2021-04-06 NOTE — PHYSICAL EXAM
[General Appearance - Well Developed] : well developed [Normal Appearance] : normal appearance [Well Groomed] : well groomed [General Appearance - Well Nourished] : well nourished [No Deformities] : no deformities [General Appearance - In No Acute Distress] : no acute distress [Normal Conjunctiva] : the conjunctiva exhibited no abnormalities [Eyelids - No Xanthelasma] : the eyelids demonstrated no xanthelasmas [Normal Oral Mucosa] : normal oral mucosa [No Oral Pallor] : no oral pallor [No Oral Cyanosis] : no oral cyanosis [Normal Jugular Venous A Waves Present] : normal jugular venous A waves present [Normal Jugular Venous V Waves Present] : normal jugular venous V waves present [No Jugular Venous Burton A Waves] : no jugular venous burton A waves [Respiration, Rhythm And Depth] : normal respiratory rhythm and effort [Exaggerated Use Of Accessory Muscles For Inspiration] : no accessory muscle use [Auscultation Breath Sounds / Voice Sounds] : lungs were clear to auscultation bilaterally [Heart Rate And Rhythm] : heart rate and rhythm were normal [Heart Sounds] : normal S1 and S2 [Murmurs] : no murmurs present [Abdomen Soft] : soft [Abdomen Tenderness] : non-tender [Abdomen Mass (___ Cm)] : no abdominal mass palpated [Abnormal Walk] : normal gait [Gait - Sufficient For Exercise Testing] : the gait was sufficient for exercise testing [Nail Clubbing] : no clubbing of the fingernails [Cyanosis, Localized] : no localized cyanosis [Petechial Hemorrhages (___cm)] : no petechial hemorrhages [Skin Color & Pigmentation] : normal skin color and pigmentation [] : no rash [No Venous Stasis] : no venous stasis [Skin Lesions] : no skin lesions [No Skin Ulcers] : no skin ulcer [No Xanthoma] : no  xanthoma was observed

## 2021-04-06 NOTE — DISCUSSION/SUMMARY
[___ Month(s)] : [unfilled] month(s) [FreeTextEntry1] : 69 y/o M with CAD s/p PCI to RCA/LAD 10/16/2020 at [facility] presents for follow up, doing well overall since PCI, no active complaints today\par \par # CAD\par Continue ASA, Brilinta, continue PPI\par OMT with Lipitor and Toprol XL\par BPs at goal\par Completed Cardiac rehab program\par \par # HLD\par Continue statin\par Lipids at goal, follows with PCP

## 2021-04-06 NOTE — HISTORY OF PRESENT ILLNESS
[FreeTextEntry1] : 71 y/o M with PMHx of CAD s/p PCI to RCA / Rota PCI to LAD 10/16/2020 at [facility], HLD, FHx of CAD presents for follow up.\par Patient completed cardiac rehab and reports that he has been doing well from cardiac stand point.\par \par Denies chest pain, SOB, fatigue, leg swelling, syncope / presynope. No bleeding issues on ASA / Brilinta

## 2021-05-03 ENCOUNTER — NON-APPOINTMENT (OUTPATIENT)
Age: 71
End: 2021-05-03

## 2021-05-05 ENCOUNTER — APPOINTMENT (OUTPATIENT)
Dept: OPHTHALMOLOGY | Facility: CLINIC | Age: 71
End: 2021-05-05
Payer: MEDICARE

## 2021-05-05 ENCOUNTER — NON-APPOINTMENT (OUTPATIENT)
Age: 71
End: 2021-05-05

## 2021-05-05 PROCEDURE — 92004 COMPRE OPH EXAM NEW PT 1/>: CPT

## 2021-05-05 PROCEDURE — 92134 CPTRZ OPH DX IMG PST SGM RTA: CPT

## 2021-05-06 ENCOUNTER — TRANSCRIPTION ENCOUNTER (OUTPATIENT)
Age: 71
End: 2021-05-06

## 2021-06-27 ENCOUNTER — TRANSCRIPTION ENCOUNTER (OUTPATIENT)
Age: 71
End: 2021-06-27

## 2021-10-05 ENCOUNTER — APPOINTMENT (OUTPATIENT)
Dept: HEART AND VASCULAR | Facility: CLINIC | Age: 71
End: 2021-10-05
Payer: MEDICARE

## 2021-10-05 ENCOUNTER — NON-APPOINTMENT (OUTPATIENT)
Age: 71
End: 2021-10-05

## 2021-10-05 VITALS
DIASTOLIC BLOOD PRESSURE: 70 MMHG | BODY MASS INDEX: 28.61 KG/M2 | OXYGEN SATURATION: 98 % | HEART RATE: 66 BPM | HEIGHT: 66 IN | WEIGHT: 178 LBS | SYSTOLIC BLOOD PRESSURE: 136 MMHG

## 2021-10-05 PROCEDURE — 93005 ELECTROCARDIOGRAM TRACING: CPT

## 2021-10-05 PROCEDURE — 99214 OFFICE O/P EST MOD 30 MIN: CPT

## 2021-10-08 NOTE — HISTORY OF PRESENT ILLNESS
[FreeTextEntry1] : 70 y/o M with PMHx of CAD s/p PCI to RCA / Rota PCI to LAD 10/16/2020 at [facility], HLD, FHx of CAD presents for follow up.\par Patient completed cardiac rehab 4/21\par \par EKG 10/20: Sinus rosa, otherwise normal HR 59\par EKG 10/21:Sinus rosa, unchanged from prior\par  \par Denies chest pain, SOB, fatigue, leg swelling, syncope / pre-synope. No bleeding issues on ASA / Brilinta\par \par Here for follow up regarding CAD, no new complaints

## 2021-10-08 NOTE — DISCUSSION/SUMMARY
[FreeTextEntry1] : 70 y/o M with CAD s/p PCI to RCA/Rota LAD 10/16/2020 at [facility] presents for follow up, doing well overall since PCI, no active complaints today\par \par # CAD\par Continue ASA, continue PPI\par He wanted to discuss discontinuing Brilinta, has has been 1 year since his stents, risks and benefits were discussed extensively\par We decided to continue Brilinta for now, patient can stop prior to Colonoscopy (5-7 days) and instead of resuming it we can switch to Plavix\par OMT with Lipitor and Toprol XL\par BPs at goal\par Completed Cardiac rehab program\par \par # HLD\par Continue statin\par Lipids at goal, follows with PCP. \par

## 2022-02-22 ENCOUNTER — APPOINTMENT (OUTPATIENT)
Dept: HEART AND VASCULAR | Facility: CLINIC | Age: 72
End: 2022-02-22
Payer: MEDICARE

## 2022-02-22 VITALS
SYSTOLIC BLOOD PRESSURE: 135 MMHG | WEIGHT: 181 LBS | OXYGEN SATURATION: 98 % | DIASTOLIC BLOOD PRESSURE: 65 MMHG | BODY MASS INDEX: 29.09 KG/M2 | HEIGHT: 66 IN | HEART RATE: 64 BPM | TEMPERATURE: 98.7 F

## 2022-02-22 PROCEDURE — 99214 OFFICE O/P EST MOD 30 MIN: CPT

## 2022-02-22 RX ORDER — TICAGRELOR 90 MG/1
90 TABLET ORAL TWICE DAILY
Qty: 180 | Refills: 2 | Status: DISCONTINUED | COMMUNITY
Start: 1900-01-01 | End: 2022-02-22

## 2022-02-22 RX ORDER — CLOPIDOGREL BISULFATE 75 MG/1
75 TABLET, FILM COATED ORAL DAILY
Qty: 90 | Refills: 3 | Status: DISCONTINUED | COMMUNITY
Start: 2021-10-05 | End: 2022-02-22

## 2022-02-24 NOTE — DISCUSSION/SUMMARY
[___ Month(s)] : in [unfilled] month(s) [FreeTextEntry1] : 71 y/o M with HLD, CAD s/p PCI to RCA/Rota LAD 10/16/2020 at [facility] presents for follow up, doing well overall since PCI, no active complaints\par \par # CAD\par Continue ASA, continue PPI\par Will switch to Plavix from Brilinta for maintenance therapy, had a bruise on leg which is no improvig\par OMT with Lipitor and Toprol XL\par BPs at goal\par Completed Cardiac rehab program\par Echo ordered, can get prior to next follow up\par \par # HLD\par Continue statin\par follows with PCP. \par Labs checked at PCP office, will request to sent to us\par \par # HTN\par Continue BB, BPs at goal

## 2022-02-24 NOTE — REASON FOR VISIT
[FreeTextEntry1] : 71 y/o M with PMHx of CAD s/p PCI to RCA / Rota PCI to LAD 10/16/2020 at [facility], HLD, FHx of CAD presents for follow up.\par Patient completed cardiac rehab 4/21\par \par EKG 10/20: Sinus rosa, otherwise normal HR 59\par EKG 10/21:Sinus rosa, unchanged from prior\par  \par Denies chest pain, SOB, fatigue, leg swelling, syncope / pre-synope. No bleeding issues on ASA / Brilinta\par \par Here for follow up regarding CAD, no new complaints

## 2022-02-24 NOTE — PHYSICAL EXAM
[Well Developed] : well developed [Well Nourished] : well nourished [No Acute Distress] : no acute distress [Normal Conjunctiva] : normal conjunctiva [Normal Venous Pressure] : normal venous pressure [No Carotid Bruit] : no carotid bruit [Normal S1, S2] : normal S1, S2 [No Murmur] : no murmur [No Rub] : no rub [No Gallop] : no gallop [Clear Lung Fields] : clear lung fields [Good Air Entry] : good air entry [No Respiratory Distress] : no respiratory distress  [Soft] : abdomen soft [Non Tender] : non-tender [No Masses/organomegaly] : no masses/organomegaly [Normal Bowel Sounds] : normal bowel sounds [Normal Gait] : normal gait [No Edema] : no edema [No Cyanosis] : no cyanosis [No Clubbing] : no clubbing [No Varicosities] : no varicosities [No Rash] : no rash [No Skin Lesions] : no skin lesions [Moves all extremities] : moves all extremities [No Focal Deficits] : no focal deficits [Normal Speech] : normal speech [Alert and Oriented] : alert and oriented [Normal memory] : normal memory [de-identified] : Rt thigh ecchymosis seen, appears to be resolving

## 2022-07-26 ENCOUNTER — NON-APPOINTMENT (OUTPATIENT)
Age: 72
End: 2022-07-26

## 2022-07-26 ENCOUNTER — APPOINTMENT (OUTPATIENT)
Dept: HEART AND VASCULAR | Facility: CLINIC | Age: 72
End: 2022-07-26

## 2022-07-26 VITALS
HEIGHT: 66 IN | WEIGHT: 186.6 LBS | OXYGEN SATURATION: 97 % | SYSTOLIC BLOOD PRESSURE: 135 MMHG | HEART RATE: 57 BPM | DIASTOLIC BLOOD PRESSURE: 75 MMHG | BODY MASS INDEX: 29.99 KG/M2

## 2022-07-26 PROCEDURE — 99213 OFFICE O/P EST LOW 20 MIN: CPT

## 2022-07-26 PROCEDURE — 93000 ELECTROCARDIOGRAM COMPLETE: CPT

## 2022-07-27 NOTE — DISCUSSION/SUMMARY
[FreeTextEntry1] : 71 y/o M with HLD, CAD s/p PCI to RCA/Rota LAD 10/16/2020 at [facility] presents for follow up, doing well overall since PCI, no active complaints\par \par # CAD\par Continue ASA, continue PPI\par Continue Plavix, will reassess next appointment regarding discontinuing\par OMT with Lipitor and Toprol XL\par BPs at goal\par \par # HLD\par Continue statin\par follows with PCP. \par Labs checked at PCP office, will request to sent to us\par \par # HTN\par Continue BB, BPs at goal [EKG obtained to assist in diagnosis and management of assessed problem(s)] : EKG obtained to assist in diagnosis and management of assessed problem(s)

## 2022-07-27 NOTE — HISTORY OF PRESENT ILLNESS
[FreeTextEntry1] : 71 y/o M with PMHx of CAD s/p PCI to RCA / Rota PCI to LAD 10/16/2020 at [facility], HLD, FHx of CAD presents for follow up.\par Patient completed cardiac rehab 4/21\par \par EKG 10/20: Sinus rosa, otherwise normal HR 59\par EKG 10/21:Sinus rosa, unchanged from prior\par EKG 7/26/2022: Sinus rosa, HR 55\par Echo 7/2022: EF 65%, mild-mod AI, mild MR, mild TR PASP 21mmHg, basal septal hypertrophy\par  \par Denies chest pain, SOB, fatigue, leg swelling, syncope / pre-synope. No bleeding issues on ASA / Plavix\par \par Here for follow up regarding CAD, no new complaints

## 2022-12-29 ENCOUNTER — APPOINTMENT (OUTPATIENT)
Dept: AFTER HOURS CARE | Facility: EMERGENCY ROOM | Age: 72
End: 2022-12-29
Payer: MEDICARE

## 2022-12-29 DIAGNOSIS — U07.1 COVID-19: ICD-10-CM

## 2022-12-29 PROCEDURE — 99203 OFFICE O/P NEW LOW 30 MIN: CPT | Mod: CS,95

## 2022-12-29 NOTE — HISTORY OF PRESENT ILLNESS
[Home] : at home, [unfilled] , at the time of the visit. [Other Location: e.g. Home (Enter Location, City,State)___] : at [unfilled] [Verbal consent obtained from patient] : the patient, [unfilled] [FreeTextEntry8] : 72y M CAD w/4 stents, HTN, hld now p/w tested positive COVID 7d ago for sneezing and rhinorrhea after contact with\par confirmed COVID case. was taking corcetin HBP. COVID RAT had faded in past few days but is now stronger positive,\par and pt asking if he should be prescribed paxlovid. Pt is feeling well, less malaise and congestion than before, mild\par cough, no SOB.

## 2022-12-29 NOTE — ASSESSMENT
[FreeTextEntry1] : Well appearing pt. Theres no recommendation to take paxlovid or any other COVID antiviral based on worsening of strength of RAT, especially >5d from sx onset.

## 2022-12-29 NOTE — PLAN
[No new medications perscribed] : Treat in place: No new medications prescribed [FreeTextEntry1] : reassurance\par continue supportive care.\par isolation recommendations reviewed.\par pmd f.u

## 2023-01-31 ENCOUNTER — NON-APPOINTMENT (OUTPATIENT)
Age: 73
End: 2023-01-31

## 2023-01-31 ENCOUNTER — APPOINTMENT (OUTPATIENT)
Dept: HEART AND VASCULAR | Facility: CLINIC | Age: 73
End: 2023-01-31
Payer: MEDICARE

## 2023-01-31 VITALS
HEART RATE: 69 BPM | TEMPERATURE: 98.7 F | SYSTOLIC BLOOD PRESSURE: 130 MMHG | BODY MASS INDEX: 21.87 KG/M2 | WEIGHT: 189 LBS | HEIGHT: 78 IN | DIASTOLIC BLOOD PRESSURE: 60 MMHG | OXYGEN SATURATION: 98 %

## 2023-01-31 PROCEDURE — 99214 OFFICE O/P EST MOD 30 MIN: CPT

## 2023-01-31 PROCEDURE — 93000 ELECTROCARDIOGRAM COMPLETE: CPT

## 2023-02-01 NOTE — HISTORY OF PRESENT ILLNESS
[FreeTextEntry1] : 71 y/o M with PMHx of CAD s/p PCI to RCA / Rota PCI to LAD 10/16/2020 at [facility], HLD, FHx of CAD presents for follow up.\par \par Cath 10/16/2020: Rota PCI/FORREST LAD, PCI/FORREST prox/distal RCA\par \par Echo 7/2022: EF 65%, mild-mod AI, mild MR, mild TR PASP 21mmHg, basal septal hypertrophy\par \par EKG 10/20: Sinus rosa, otherwise normal HR 59\par EKG 10/21:Sinus rosa, unchanged from prior\par EKG 7/26/2022: Sinus rosa, HR 55\par EKG 1/31/2023: Sinus rosa, HR 55, otherwise normal\par  \par Patient completed cardiac rehab 4/21\par \par Denies chest pain, SOB, fatigue, leg swelling, syncope / pre-synope. No bleeding issues on ASA / Plavix\par \par Here for follow up regarding CAD, no new complaints

## 2023-02-01 NOTE — DISCUSSION/SUMMARY
[___ Month(s)] : in [unfilled] month(s) [FreeTextEntry1] : 71 y/o M with HLD, CAD s/p PCI to RCA/Rota LAD 10/16/2020 at [facility] presents for follow up, doing well overall since PCI, no active complaints\par \par # CAD\par Continue ASA, continue PPI\par Has been on DAPT for > 2 years, we discussed risks vs. benefits of discontinuing, patient wishes to discontinue Plavix at this time, will continue ASA life long\par OMT with Lipitor and Toprol XL\par BPs at goal\par \par # HLD\par Continue statin\par follows with PCP. \par \par # HTN\par Continue BB, BPs at goal.  [EKG obtained to assist in diagnosis and management of assessed problem(s)] : EKG obtained to assist in diagnosis and management of assessed problem(s)

## 2023-05-30 ENCOUNTER — RESULT REVIEW (OUTPATIENT)
Age: 73
End: 2023-05-30

## 2023-06-01 DIAGNOSIS — R55 SYNCOPE AND COLLAPSE: ICD-10-CM

## 2023-09-19 ENCOUNTER — NON-APPOINTMENT (OUTPATIENT)
Age: 73
End: 2023-09-19

## 2023-09-19 ENCOUNTER — APPOINTMENT (OUTPATIENT)
Dept: HEART AND VASCULAR | Facility: CLINIC | Age: 73
End: 2023-09-19
Payer: MEDICARE

## 2023-09-19 VITALS
DIASTOLIC BLOOD PRESSURE: 72 MMHG | HEART RATE: 68 BPM | SYSTOLIC BLOOD PRESSURE: 124 MMHG | OXYGEN SATURATION: 98 % | HEIGHT: 66 IN | WEIGHT: 186 LBS | BODY MASS INDEX: 29.89 KG/M2

## 2023-09-19 PROCEDURE — 99214 OFFICE O/P EST MOD 30 MIN: CPT

## 2023-09-19 PROCEDURE — 93000 ELECTROCARDIOGRAM COMPLETE: CPT

## 2023-09-19 RX ORDER — CLOPIDOGREL BISULFATE 75 MG/1
75 TABLET, FILM COATED ORAL
Qty: 90 | Refills: 3 | Status: DISCONTINUED | COMMUNITY
Start: 2022-02-22 | End: 2023-09-19

## 2024-07-02 ENCOUNTER — APPOINTMENT (OUTPATIENT)
Dept: HEART AND VASCULAR | Facility: CLINIC | Age: 74
End: 2024-07-02
Payer: MEDICARE

## 2024-07-02 ENCOUNTER — NON-APPOINTMENT (OUTPATIENT)
Age: 74
End: 2024-07-02

## 2024-07-02 VITALS
SYSTOLIC BLOOD PRESSURE: 130 MMHG | HEIGHT: 66 IN | WEIGHT: 192 LBS | TEMPERATURE: 98.7 F | BODY MASS INDEX: 30.86 KG/M2 | DIASTOLIC BLOOD PRESSURE: 70 MMHG | HEART RATE: 60 BPM | OXYGEN SATURATION: 98 %

## 2024-07-02 PROCEDURE — 99214 OFFICE O/P EST MOD 30 MIN: CPT

## 2024-07-02 PROCEDURE — 93000 ELECTROCARDIOGRAM COMPLETE: CPT

## 2024-07-16 ENCOUNTER — TRANSCRIPTION ENCOUNTER (OUTPATIENT)
Age: 74
End: 2024-07-16

## 2024-10-10 ENCOUNTER — NON-APPOINTMENT (OUTPATIENT)
Age: 74
End: 2024-10-10

## 2025-01-07 ENCOUNTER — APPOINTMENT (OUTPATIENT)
Dept: HEART AND VASCULAR | Facility: CLINIC | Age: 75
End: 2025-01-07
Payer: MEDICARE

## 2025-01-07 ENCOUNTER — NON-APPOINTMENT (OUTPATIENT)
Age: 75
End: 2025-01-07

## 2025-01-07 VITALS
SYSTOLIC BLOOD PRESSURE: 128 MMHG | WEIGHT: 195 LBS | BODY MASS INDEX: 31.34 KG/M2 | HEART RATE: 56 BPM | TEMPERATURE: 98.1 F | HEIGHT: 66 IN | DIASTOLIC BLOOD PRESSURE: 68 MMHG | OXYGEN SATURATION: 98 %

## 2025-01-07 PROCEDURE — 93000 ELECTROCARDIOGRAM COMPLETE: CPT

## 2025-01-07 PROCEDURE — 99214 OFFICE O/P EST MOD 30 MIN: CPT

## 2025-06-23 NOTE — H&P ADULT - NEUROLOGICAL
June 23, 2025     Patient: Homero Aguilera   YOB: 1949   Date of Visit: 6/23/2025       To Whom It May Concern:    Homero Aguilera was seen in my clinic on 6/23/2025 at 9:00 am. Please excuse Homero for his absence from work on this day to make the appointment.      Please excuse from work June 23 to June 29 and return to work June 30, 2025.   If you have any questions or concerns, please don't hesitate to call.         Sincerely,         Mauricio Kuo MD        CC: No Recipients   Alert & oriented; no sensory, motor or coordination deficits, normal reflexes

## 2025-06-24 ENCOUNTER — APPOINTMENT (OUTPATIENT)
Dept: HEART AND VASCULAR | Facility: CLINIC | Age: 75
End: 2025-06-24
Payer: MEDICARE

## 2025-06-24 VITALS
BODY MASS INDEX: 29.41 KG/M2 | DIASTOLIC BLOOD PRESSURE: 64 MMHG | HEART RATE: 65 BPM | WEIGHT: 183 LBS | OXYGEN SATURATION: 95 % | SYSTOLIC BLOOD PRESSURE: 120 MMHG | TEMPERATURE: 98.3 F | HEIGHT: 66 IN

## 2025-06-24 PROCEDURE — 99214 OFFICE O/P EST MOD 30 MIN: CPT

## 2025-06-24 PROCEDURE — 93000 ELECTROCARDIOGRAM COMPLETE: CPT
